# Patient Record
Sex: MALE | Race: WHITE | NOT HISPANIC OR LATINO | Employment: FULL TIME | URBAN - METROPOLITAN AREA
[De-identification: names, ages, dates, MRNs, and addresses within clinical notes are randomized per-mention and may not be internally consistent; named-entity substitution may affect disease eponyms.]

---

## 2023-06-28 ENCOUNTER — HOSPITAL ENCOUNTER (EMERGENCY)
Facility: HOSPITAL | Age: 63
Discharge: HOME | End: 2023-06-28
Attending: EMERGENCY MEDICINE | Admitting: EMERGENCY MEDICINE
Payer: COMMERCIAL

## 2023-06-28 VITALS
TEMPERATURE: 97.5 F | HEART RATE: 79 BPM | OXYGEN SATURATION: 95 % | DIASTOLIC BLOOD PRESSURE: 99 MMHG | RESPIRATION RATE: 20 BRPM | SYSTOLIC BLOOD PRESSURE: 151 MMHG | WEIGHT: 223.9 LBS

## 2023-06-28 DIAGNOSIS — R60.9 PERIPHERAL EDEMA: ICD-10-CM

## 2023-06-28 DIAGNOSIS — T63.461A WASP STING: Primary | ICD-10-CM

## 2023-06-28 PROCEDURE — 86618 LYME DISEASE ANTIBODY: CPT | Performed by: EMERGENCY MEDICINE

## 2023-06-28 PROCEDURE — 36415 COLL VENOUS BLD VENIPUNCTURE: CPT | Performed by: EMERGENCY MEDICINE

## 2023-06-28 RX ORDER — DEXAMETHASONE 4 MG/1
12 TABLET ORAL ONCE
Qty: 3 TABLET | Refills: 0 | Status: SHIPPED | OUTPATIENT
Start: 2023-07-01 | End: 2023-07-01

## 2023-06-28 RX ADMIN — DEXAMETHASONE SODIUM PHOSPHATE 10 MG: 10 INJECTION, SOLUTION INTRAMUSCULAR; INTRAVENOUS at 15:35

## 2023-06-28 NOTE — ED PROVIDER NOTES
History  Chief Complaint   Patient presents with   • Leg Swelling     Patient comes in with L leg swelling after he thinks he was stung by a wasp 4 weeks ago  Took 325mg of aspirin this morning because he has slight SOB, it is gone now     65year old male, here for left leg swelling  Stung by wasp a few days ago  Swelling persistent  No hx of or risk factors for DVT  None       Past Medical History:   Diagnosis Date   • Diabetes mellitus ([facility] [address] )    • Hypertension        History reviewed  No pertinent surgical history  History reviewed  No pertinent family history  I have reviewed and agree with the history as documented  E-Cigarette/Vaping     E-Cigarette/Vaping Substances     Social History     Tobacco Use   • Smoking status: Never   • Smokeless tobacco: Never   Substance Use Topics   • Alcohol use: Never   • Drug use: Never       Review of Systems   Constitutional: Negative  Negative for chills and fever  HENT: Negative  Negative for rhinorrhea, sore throat, trouble swallowing and voice change  Eyes: Negative  Negative for pain and visual disturbance  Respiratory: Negative  Negative for cough, shortness of breath and wheezing  Cardiovascular: Negative  Negative for chest pain and palpitations  Gastrointestinal: Negative for abdominal pain, diarrhea, nausea and vomiting  Genitourinary: Negative  Negative for dysuria and frequency  Musculoskeletal: Negative  Negative for gait problem, neck pain and neck stiffness  Skin: Negative  Negative for rash  Neurological: Negative  Negative for dizziness, speech difficulty, weakness, light-headedness and numbness  Physical Exam  Physical Exam  Vitals and nursing note reviewed  Constitutional:       General: He is not in acute distress  Appearance: He is well-developed  HENT:      Head: Normocephalic and atraumatic     Eyes:      Conjunctiva/sclera: Conjunctivae normal       Pupils: Pupils are equal, round, and reactive to light  Neck:      Trachea: No tracheal deviation  Cardiovascular:      Rate and Rhythm: Normal rate and regular rhythm  Pulmonary:      Effort: Pulmonary effort is normal  No respiratory distress  Breath sounds: Normal breath sounds  No wheezing or rales  Abdominal:      General: Bowel sounds are normal  There is no distension  Palpations: Abdomen is soft  Tenderness: There is no abdominal tenderness  There is no guarding or rebound  Musculoskeletal:         General: No tenderness or deformity  Normal range of motion  Cervical back: Normal range of motion and neck supple  Skin:     General: Skin is warm and dry  Capillary Refill: Capillary refill takes less than 2 seconds  Findings: No rash  Neurological:      Mental Status: He is alert and oriented to person, place, and time     Psychiatric:         Behavior: Behavior normal          Vital Signs  ED Triage Vitals   Temperature Pulse Respirations Blood Pressure SpO2   06/28/23 1457 06/28/23 1454 06/28/23 1454 06/28/23 1454 06/28/23 1454   97 5 °F (36 4 °C) 79 20 151/99 95 %      Temp Source Heart Rate Source Patient Position - Orthostatic VS BP Location FiO2 (%)   06/28/23 1457 06/28/23 1454 06/28/23 1454 06/28/23 1454 --   Tympanic Monitor Sitting Left arm       Pain Score       --                  Vitals:    06/28/23 1454   BP: 151/99   Pulse: 79   Patient Position - Orthostatic VS: Sitting         Visual Acuity      ED Medications  Medications   dexamethasone oral liquid 10 mg 1 mL (10 mg Oral Given 6/28/23 1535)       Diagnostic Studies  Results Reviewed     Procedure Component Value Units Date/Time    Lyme Antibody Profile with reflex to [facility] [[id number]] Collected: 06/28/23 1538    Lab Status: No result Specimen: Blood from Arm, Right                  No orders to display              Procedures  Procedures         ED Course                               SBIRT 20yo+    Flowsheet Row Most Recent Value   Initial "Alcohol Screen: US AUDIT-C     1  How often do you have a drink containing alcohol? 0 Filed at: 06/28/2023 1530   2  How many drinks containing alcohol do you have on a typical day you are drinking? 0 Filed at: 06/28/2023 1530   3a  Male UNDER 65: How often do you have five or more drinks on one occasion? 0 Filed at: 06/28/2023 1530   3b  FEMALE Any Age, or MALE 65+: How often do you have 4 or more drinks on one occassion? 0 Filed at: 06/28/2023 1530   Audit-C Score 0 Filed at: 06/28/2023 1530   KEISHA: How many times in the past year have you    Used an illegal drug or used a prescription medication for non-medical reasons? Never Filed at: 06/28/2023 1530                    Medical Decision Making  No medical records available to review  Swelling of LLE likely from allergic reaction to wasp sting  Started on steriods  No risk factors for DVT no inidcation for testing in ED  Patient described a red ring for \"rash\" that resolved  Will initiate lyme testing  Patient with [address] will follow up on results and establish with PCP or return to ED for antibiotics if test is positive  Amount and/or Complexity of Data Reviewed  Labs: ordered  Risk  Prescription drug management  Disposition  Final diagnoses:   Wasp sting   Peripheral edema     Time reflects when diagnosis was documented in both MDM as applicable and the Disposition within this note     Time User Action Codes Description Comment    6/28/2023  3:29 PM Zeb Cobos Add [U35 498A] Wasp sting     6/28/2023  3:29 PM Zeb Cobos Add [R60 9] Peripheral edema       ED Disposition     ED Disposition   Discharge    Condition   Stable    Date/Time   Wed Jun 28, 2023 [facility] discharge to home/self care                 Follow-up Information     Follow up With Specialties Details Why [address] In 1 week  [address], [address] [address] [facility], [address], [address] [place], [place], [address]          Patient's Medications   Discharge Prescriptions    DEXAMETHASONE (DECADRON) 4 MG TABLET    Take 3 tablets (12 mg total) by mouth 1 (one) time for 1 dose Do not start before July 1, 2023  Start Date: 7/1/2023  End Date: 7/1/2023       Order Dose: 12 mg       Quantity: 3 tablet    Refills: 0       No discharge procedures on file      PDMP Review     None          ED Provider  Electronically Signed by           Collette Hale DO  06/28/23 1542

## 2023-06-28 NOTE — Clinical Note
Adi Álvarezevre was seen and treated in our emergency department on 6/28/2023  Diagnosis:     Ranjanliliana Jeffrey    He may return on this date: 06/30/2023         If you have any questions or concerns, please don't hesitate to call        Roxanne Graves, DO    ______________________________           _______________          _______________  Hospital Representative                              Date                                Time

## 2023-06-29 LAB — B BURGDOR IGG+IGM SER-ACNC: <0.2 AI

## 2023-07-14 ENCOUNTER — HOSPITAL ENCOUNTER (EMERGENCY)
Facility: HOSPITAL | Age: 63
Discharge: HOME | End: 2023-07-14
Attending: EMERGENCY MEDICINE
Payer: COMMERCIAL

## 2023-07-14 ENCOUNTER — APPOINTMENT (EMERGENCY)
Dept: NON INVASIVE DIAGNOSTICS | Facility: HOSPITAL | Age: 63
End: 2023-07-14
Payer: COMMERCIAL

## 2023-07-14 ENCOUNTER — OFFICE VISIT (OUTPATIENT)
Dept: URGENT CARE | Facility: CLINIC | Age: 63
End: 2023-07-14
Payer: COMMERCIAL

## 2023-07-14 VITALS
RESPIRATION RATE: 18 BRPM | OXYGEN SATURATION: 97 % | TEMPERATURE: 96.5 F | SYSTOLIC BLOOD PRESSURE: 140 MMHG | HEART RATE: 68 BPM | DIASTOLIC BLOOD PRESSURE: 86 MMHG

## 2023-07-14 VITALS
WEIGHT: 227.96 LBS | OXYGEN SATURATION: 96 % | SYSTOLIC BLOOD PRESSURE: 154 MMHG | HEART RATE: 67 BPM | RESPIRATION RATE: 18 BRPM | TEMPERATURE: 98 F | DIASTOLIC BLOOD PRESSURE: 78 MMHG

## 2023-07-14 DIAGNOSIS — M79.89 LEFT LEG SWELLING: Primary | ICD-10-CM

## 2023-07-14 DIAGNOSIS — M79.89 PAIN AND SWELLING OF LEFT LOWER EXTREMITY: Primary | ICD-10-CM

## 2023-07-14 DIAGNOSIS — M79.605 PAIN AND SWELLING OF LEFT LOWER EXTREMITY: Primary | ICD-10-CM

## 2023-07-14 DIAGNOSIS — R60.0 LOWER EXTREMITY EDEMA: ICD-10-CM

## 2023-07-14 PROCEDURE — 99284 EMERGENCY DEPT VISIT MOD MDM: CPT

## 2023-07-14 PROCEDURE — 99213 OFFICE O/P EST LOW 20 MIN: CPT | Performed by: NURSE PRACTITIONER

## 2023-07-14 PROCEDURE — 93971 EXTREMITY STUDY: CPT

## 2023-07-14 NOTE — PROGRESS NOTES
[facility]        NAME: Betsy Munson is a 65 y.o. male  : 1960    MRN: [id number]  DATE: 2023  TIME: 11:09 AM    Assessment and Plan   Pain and swelling of left lower extremity [M79.605, M79.89]  1. Pain and swelling of left lower extremity  Transfer to other facility        LLE swelling x 2 weeks. Associated with warmth. Neg homans. PMH arrhythmia was on metoprolol but has stopped approx 2-3 years prior. Denies sob/chest pain/palpitations. Denies PMH dvt/pulm embolism. No recent surgery or increase in sedentary. No anticoagulant use- does take baby aspirin. Sent to ED for further eval. Transfer of facility completed. Patient Instructions       Sent to ED for further eval    Chief Complaint     Chief Complaint   Patient presents with   • Leg Swelling     Pt reports being seen back on  in the ED for insect bite and calf swelling. Pt prescribed medication with left calf swelling decreased. Pt C/O increased left calf swelling that started on Wednesday. History of Present Illness       Patient is a 81-year-old male arrives with complaints of left lower extremity swelling and pain/tightness of the extremity. Patient reports occurring greater than 2 weeks. Originally patient was seen 2023 at the [facility] ED diagnosed with wasp sting-patient reports some improvement since then however continues with the left lower extremity tightness/pain and swelling. At the time of the ED visit he was having slight shortness of breath he does report at this point he is not having any shortness of breath. Patient denies any past medical history of DVT pulmonary embolism. He denies a recent increase in sedentary activity, recent surgery. Patient denies any anticoagulant use. Does take baby aspirin regularly. He does have a past medical history of arrhythmia he was on metoprolol he reports he was taken off approximately 3 to 4 years ago.   Denies chest pain palpitations at this point. No signs of insect bite/sting any longer. No redness. Slight warmth to the extremity        Review of Systems   Review of Systems   Constitutional: Negative for activity change, appetite change, chills, fatigue and fever. HENT: Negative for congestion, rhinorrhea, sinus pressure, sinus pain and sore throat. Respiratory: Negative for cough, chest tightness, shortness of breath and wheezing. Cardiovascular: Positive for leg swelling (left leg with tightness/pain). Negative for chest pain and palpitations. Gastrointestinal: Negative for constipation, diarrhea, nausea and vomiting. Musculoskeletal: Negative for myalgias. Skin: Negative for color change, pallor and rash. Neurological: Negative for dizziness, syncope, weakness, light-headedness and headaches. Hematological: Negative for adenopathy. Psychiatric/Behavioral: Negative for agitation and confusion. Current Medications     No current outpatient medications on file. Current Allergies     Allergies as of 07/14/2023   • (No Known Allergies)            The following portions of the patient's history were reviewed and updated as appropriate: allergies, current medications, past family history, past medical history, past social history, past surgical history and problem list.     Past Medical History:   Diagnosis Date   • Diabetes mellitus ([address])    • Hypertension        History reviewed. No pertinent surgical history. History reviewed. No pertinent family history. Medications have been verified. Objective   /86 (BP Location: Right arm, Patient Position: Sitting, Cuff Size: Large)   Pulse 68   Temp (!) 96.5 °F (35.8 °C) (Tympanic)   Resp 18   SpO2 97%   No LMP for male patient. Physical Exam     Physical Exam  Vitals and nursing note reviewed. Constitutional:       General: He is not in acute distress. Appearance: Normal appearance. He is not ill-appearing, toxic-appearing or diaphoretic. HENT:      Head: Normocephalic and atraumatic. Nose: No congestion or rhinorrhea. Mouth/Throat:      Mouth: Mucous membranes are moist.   Eyes:      General: No scleral icterus. Right eye: No discharge. Left eye: No discharge. Conjunctiva/sclera: Conjunctivae normal.   Cardiovascular:      Rate and Rhythm: Normal rate and regular rhythm. Pulmonary:      Effort: Pulmonary effort is normal. No respiratory distress. Breath sounds: Normal breath sounds. No stridor. No wheezing, rhonchi or rales. Musculoskeletal:         General: Swelling (left lower extremity with warmth) present. Normal range of motion. Cervical back: Normal range of motion. Skin:     General: Skin is dry. Neurological:      Mental Status: He is alert and oriented to person, place, and time. Psychiatric:         Mood and Affect: Mood normal.         Behavior: Behavior normal.         Thought Content:  Thought content normal.         Judgment: Judgment normal.

## 2023-07-15 PROCEDURE — 93971 EXTREMITY STUDY: CPT | Performed by: SURGERY

## 2023-07-15 NOTE — ED PROVIDER NOTES
History  Chief Complaint   Patient presents with   • Leg Swelling     Pt states that left calf feels tight and swollen. States that 2 weeks ago he was seen by urgent care and ED for what he suspected was an insect bite or some sort in the back of left calf. Had lyme test done which was negative. Morgan Swann is a 64 yo M presenting with swelling to L leg over the past several weeks. He notes chronic bilateral lower leg swelling which worsens throughout the day and improves at night and with elevation. Reports being on his feet most of the day and attributes prior swelling to this. He reports several weeks ago he was doing yard work and felt some "stinging" in calf. Reports shortly after this is when the increased L sided swelling was noted, although he does not recall a specific insect bite or rash. The swelling has waxed/waned since that time overall, but notes persistently asymmetric from L side. No recent trauma or surgery. No prolonged immobilization or recent travel. No history of DVT or PE. No family history of blood clots or coagulation disorder. He is otherwise asymptomatic. History provided by:  Patient   used: No        None       Past Medical History:   Diagnosis Date   • Diabetes mellitus ([address])    • Hypertension        History reviewed. No pertinent surgical history. History reviewed. No pertinent family history. I have reviewed and agree with the history as documented. E-Cigarette/Vaping     E-Cigarette/Vaping Substances     Social History     Tobacco Use   • Smoking status: Never   • Smokeless tobacco: Never   Substance Use Topics   • Alcohol use: Never   • Drug use: Never       Review of Systems   Constitutional: Negative for chills and fever. HENT: Negative for congestion, rhinorrhea and sore throat. Eyes: Negative for pain and visual disturbance. Respiratory: Negative for cough, shortness of breath and wheezing. Cardiovascular: Positive for leg swelling. Negative for chest pain and palpitations. Gastrointestinal: Negative for abdominal pain, nausea and vomiting. Genitourinary: Negative for dysuria, frequency and urgency. Musculoskeletal: Negative for back pain, neck pain and neck stiffness. Skin: Negative for rash and wound. Neurological: Negative for dizziness, weakness, light-headedness and numbness. Physical Exam  Physical Exam  Constitutional:       General: He is not in acute distress. Appearance: He is well-developed. He is not diaphoretic. HENT:      Head: Normocephalic and atraumatic. Right Ear: External ear normal.      Left Ear: External ear normal.   Eyes:      Conjunctiva/sclera: Conjunctivae normal.      Pupils: Pupils are equal, round, and reactive to light. Cardiovascular:      Rate and Rhythm: Normal rate and regular rhythm. Heart sounds: Normal heart sounds. No murmur heard. No friction rub. No gallop. Comments: 1+ RLE and 2+ LLE edema, asymmetric L>R edema noted extending up to knee. 2+ dorsalis pedis, posterior tibial pulses distally. Intact sensation, brisk cap refill. Skin of both LLE's is pink/warm/dry  Pulmonary:      Effort: Pulmonary effort is normal. No respiratory distress. Breath sounds: Normal breath sounds. No wheezing, rhonchi or rales. Abdominal:      General: There is no distension. Palpations: Abdomen is soft. Tenderness: There is no abdominal tenderness. Musculoskeletal:      Cervical back: Normal range of motion and neck supple. Lymphadenopathy:      Cervical: No cervical adenopathy. Skin:     General: Skin is warm and dry. Capillary Refill: Capillary refill takes less than 2 seconds. Findings: No erythema or rash. Neurological:      Mental Status: He is alert and oriented to person, place, and time. Motor: No abnormal muscle tone.       Coordination: Coordination normal.   Psychiatric:         Behavior: Behavior normal.         Thought Content: Thought content normal.         Judgment: Judgment normal.         Vital Signs  ED Triage Vitals [07/14/23 1243]   Temperature Pulse Respirations Blood Pressure SpO2   98 °F (36.7 °C) 67 18 154/78 96 %      Temp Source Heart Rate Source Patient Position - Orthostatic VS BP Location FiO2 (%)   Oral Monitor -- -- --      Pain Score       No Pain           Vitals:    07/14/23 1243   BP: 154/78   Pulse: 67         Visual Acuity      ED Medications  Medications - No data to display    Diagnostic Studies  Results Reviewed     None                 VAS lower limb venous duplex study, unilateral/limited    (Results Pending)              Procedures  Procedures         ED Course                               SBIRT 20yo+    Flowsheet Row Most Recent Value   Initial Alcohol Screen: US AUDIT-C     1. How often do you have a drink containing alcohol? 0 Filed at: 07/14/2023 1407   2. How many drinks containing alcohol do you have on a typical day you are drinking? 0 Filed at: 07/14/2023 1407   3a. Male UNDER 65: How often do you have five or more drinks on one occasion? 0 Filed at: 07/14/2023 1407   Audit-C Score 0 Filed at: 07/14/2023 1407   KEISHA: How many times in the past year have you. .. Used an illegal drug or used a prescription medication for non-medical reasons? Never Filed at: 07/14/2023 1407                    Medical Decision Making  Ongoing chronic bilateral leg swelling, worse with standing/long periods upright and improved with elevation/at night suggesting possible venous insufficiency. He additionally has had asymmetric LLE swelling over the past few weeks which is noted on exam. He has no additional risk factors for DVT. No rashes/lesions noted, intact pulses/sensation distally. LLE duplex obtained here which is negative for acute DVT per initial vascular technician report. Pt is otherwise asymptomatic, remainder of exam benign. Recommend starting use of compression stockings when upright.  Follow up with PCP recommended for re-evaluation of symptoms. Return to ED indications reviewed. Disposition  Final diagnoses:   Left leg swelling   Lower extremity edema     Time reflects when diagnosis was documented in both MDM as applicable and the Disposition within this note     Time User Action Codes Description Comment    7/14/2023  2:17 PM Samanta Donato [M79.89] Left leg swelling     7/15/2023  7:17 AM Samanta Donato [R60.0] Lower extremity edema       ED Disposition     ED Disposition   Discharge    Condition   Stable    Date/Time   Fri Jul 14, 2023  2:17 PM    Comment   Carmencita Mccrary discharge to home/self care. Follow-up Information     Follow up With Specialties Details Why Contact Info Additional [facility] Emergency Department Emergency Medicine  If symptoms worsen [address] [id number]  [address] [facility] Emergency Department, [address] [place] [address], [facility]          There are no discharge medications for this patient.       Outpatient Discharge Orders   Compression Stocking       PDMP Review     None          ED Provider  Electronically Signed by           Martha Brantley PA-C  07/15/23 1124

## 2023-08-03 ENCOUNTER — OFFICE VISIT (OUTPATIENT)
Dept: FAMILY MEDICINE CLINIC | Facility: CLINIC | Age: 63
End: 2023-08-03
Payer: COMMERCIAL

## 2023-08-03 VITALS
HEART RATE: 75 BPM | BODY MASS INDEX: 29.8 KG/M2 | SYSTOLIC BLOOD PRESSURE: 128 MMHG | TEMPERATURE: 98 F | HEIGHT: 74 IN | OXYGEN SATURATION: 96 % | DIASTOLIC BLOOD PRESSURE: 78 MMHG | WEIGHT: 232.2 LBS

## 2023-08-03 DIAGNOSIS — Z13.29 SCREENING FOR THYROID DISORDER: ICD-10-CM

## 2023-08-03 DIAGNOSIS — Z11.59 NEED FOR HEPATITIS C SCREENING TEST: ICD-10-CM

## 2023-08-03 DIAGNOSIS — Z13.220 SCREENING, LIPID: ICD-10-CM

## 2023-08-03 DIAGNOSIS — Z13.1 SCREENING FOR DIABETES MELLITUS: ICD-10-CM

## 2023-08-03 DIAGNOSIS — Z12.5 SCREENING PSA (PROSTATE SPECIFIC ANTIGEN): ICD-10-CM

## 2023-08-03 DIAGNOSIS — M79.89 LEFT LEG SWELLING: ICD-10-CM

## 2023-08-03 DIAGNOSIS — M25.462 PAIN AND SWELLING OF LEFT KNEE: Primary | ICD-10-CM

## 2023-08-03 DIAGNOSIS — Z13.0 SCREENING, ANEMIA, DEFICIENCY, IRON: ICD-10-CM

## 2023-08-03 DIAGNOSIS — M25.562 PAIN AND SWELLING OF LEFT KNEE: Primary | ICD-10-CM

## 2023-08-03 PROCEDURE — 99214 OFFICE O/P EST MOD 30 MIN: CPT | Performed by: NURSE PRACTITIONER

## 2023-08-03 RX ORDER — PREDNISONE 10 MG/1
TABLET ORAL
Qty: 42 TABLET | Refills: 0 | Status: SHIPPED | OUTPATIENT
Start: 2023-08-03

## 2023-08-03 RX ORDER — DOXYCYCLINE HYCLATE 100 MG/1
100 CAPSULE ORAL EVERY 12 HOURS SCHEDULED
Qty: 20 CAPSULE | Refills: 0 | Status: SHIPPED | OUTPATIENT
Start: 2023-08-03 | End: 2023-08-13

## 2023-08-03 RX ORDER — ASPIRIN 325 MG
325 TABLET ORAL DAILY
COMMUNITY

## 2023-08-03 NOTE — ASSESSMENT & PLAN NOTE
Infection vs lymphedema  Check labs today  Rx Doxy and steroid taper for symptoms  Compression/Elevation  F/U 2 weeks   Return sooner with concerns

## 2023-08-03 NOTE — ASSESSMENT & PLAN NOTE
Left knee effusion - mild warmth  Referral to Ortho - likely drain  Itchy red, generalized rash  Check labs today  Rx Doxy and steroid taper for symptoms  Compression/Elevation  F/U 2 weeks   Return sooner with concerns

## 2023-08-03 NOTE — ASSESSMENT & PLAN NOTE
Infection vs lymphadema  Left knee effusion - mild warmth  Referral to Ortho - likely drain  Itchy red, generalized rash  Check labs today  Rx Doxy and steroid taper for symptoms  Compression/Elevation  F/U 2 weeks   Return sooner with concerns

## 2023-08-03 NOTE — PROGRESS NOTES
[facility]    ASSESSMENT AND PLAN     1. Pain and swelling of left knee  Assessment & Plan:  Left knee effusion - mild warmth  Referral to Ortho - likely drain  Itchy red, generalized rash  Check labs today  Rx Doxy and steroid taper for symptoms  Compression/Elevation  F/U 2 weeks   Return sooner with concerns    Orders:  -     Ambulatory Referral to Orthopedic Surgery; Future  -     doxycycline hyclate (VIBRAMYCIN) 100 mg capsule; Take 1 capsule (100 mg total) by mouth every 12 (twelve) hours for 10 days  -     predniSONE 10 mg tablet; Taper: 6 tabs x2 days, 5 tabs x2 days, 4 tabs x2 days, 3 tabs x2 days, 2 tabs x2 days, 1 tab x2 day    2. Left leg swelling  Assessment & Plan:  Infection vs lymphedema  Check labs today  Rx Doxy and steroid taper for symptoms  Compression/Elevation  F/U 2 weeks   Return sooner with concerns      3. Screening, anemia, deficiency, iron  -     CBC and differential; Future    4. Screening for diabetes mellitus  -     Comprehensive metabolic panel; Future    5. Screening, lipid  -     Lipid panel; Future    6. Screening PSA (prostate specific antigen)  -     PSA, Total Screen; Future    7. Need for hepatitis C screening test  -     Hepatitis C antibody; Future    8. Screening for thyroid disorder  -     TSH, 3rd generation with Free T4 reflex; Future         SUBJECTIVE       Patient ID: Josiah Bailey is a 65 y.o. male. Chief Complaint   Patient presents with   • New Patient Visit   • Mass     Bump on left knee. Patient was bit by bug on fathers day weekend. Leg was swollen almost immediately. Had red [place] around the spot and painful. Had pain coming from the back of his left and moved around to front. • itching   • stiff neck   • Edema     Left leg swollen. HISTORY OF PRESENT ILLNESS    New pt. Establishing primary care. Several years since last preventative visit. He will have records transferred. Presents today with acute problem.  Reports was bit by (presummed) wasp 2 months ago on left calf. Has had pain and swelling in left leg since. Initially entire leg was swollen - from thigh to foot. Reports though it has been improving. Lower calf still swollen today. Has a likely knee effusion and recently developed a itchy, generalized rash - since improved. He has been seen in urgent care and ER for same. Record reviewed:    6/28/ ER note  Leg Swelling  Patient comes in with L leg swelling after he thinks he was stung by a wasp 4 weeks ago. Took 325mg of aspirin this morning because he has slight SOB, it is gone now  65year old male, here for left leg swelling. Stung by wasp a few days ago. Swelling persistent. No hx of or risk factors for DVT. Treated with Decadron taper    6/29 Lyme (-)    7/14 Urgent care note  LLE swelling x 2 weeks. Associated with warmth. Neg homans. PMH arrhythmia was on metoprolol but has stopped approx 2-3 years prior. Denies sob/chest pain/palpitations. Denies PMH dvt/pulm embolism. No recent surgery or increase in sedentary. No anticoagulant use- does take baby aspirin. Sent to ED for further eval. Transfer of facility completed. 7/14 ER note  Freddy Maria is a 66 yo M presenting with swelling to L leg over the past several weeks. He notes chronic bilateral lower leg swelling which worsens throughout the day and improves at night and with elevation. Reports being on his feet most of the day and attributes prior swelling to this. He reports several weeks ago he was doing yard work and felt some "stinging" in calf. Reports shortly after this is when the increased L sided swelling was noted, although he does not recall a specific insect bite or rash. The swelling has waxed/waned since that time overall, but notes persistently asymmetric from L side. No recent trauma or surgery. No prolonged immobilization or recent travel. No history of DVT or PE. No family history of blood clots or coagulation disorder. He is otherwise asymptomatic. Duplex (-). Advised compression hose    Pt reports he is otherwise healthy. Has history of HTN and DM. Resolved after significant wieght loss (80 lbs). No lower takes medication. Over due for preventative care as above          The following portions of the patient's history were reviewed and updated as appropriate: allergies, current medications, past family history, past medical history, past social history, past surgical history, and problem list.    REVIEW OF SYSTEMS  Review of Systems   Constitutional: Negative. Negative for activity change, appetite change, fatigue and fever. Respiratory: Negative. Cardiovascular: Positive for leg swelling. Negative for chest pain and palpitations. Gastrointestinal: Negative. Musculoskeletal: Positive for joint swelling. Neurological: Negative. Psychiatric/Behavioral: Negative. OBJECTIVE      VITAL SIGNS  /78 (BP Location: Left arm, Patient Position: Sitting, Cuff Size: Standard)   Pulse 75   Temp 98 °F (36.7 °C)   Ht 6' 2" (1.88 m)   Wt 105 kg (232 lb 3.2 oz)   SpO2 96%   BMI 29.81 kg/m²     CURRENT MEDICATIONS    Current Outpatient Medications:   •  aspirin 325 mg tablet, Take 325 mg by mouth daily, Disp: , Rfl:   •  doxycycline hyclate (VIBRAMYCIN) 100 mg capsule, Take 1 capsule (100 mg total) by mouth every 12 (twelve) hours for 10 days, Disp: 20 capsule, Rfl: 0  •  predniSONE 10 mg tablet, Taper: 6 tabs x2 days, 5 tabs x2 days, 4 tabs x2 days, 3 tabs x2 days, 2 tabs x2 days, 1 tab x2 day, Disp: 42 tablet, Rfl: 0      PHYSICAL EXAMINATION   Physical Exam  Vitals and nursing note reviewed. Constitutional:       General: He is not in acute distress. Appearance: Normal appearance. He is well-developed and well-groomed. He is not ill-appearing. HENT:      Head: Normocephalic. Cardiovascular:      Rate and Rhythm: Normal rate and regular rhythm. Pulses:           Dorsalis pedis pulses are 2+ on the left side.         Posterior tibial pulses are 2+ on the right side and 2+ on the left side. Heart sounds: Normal heart sounds. Pulmonary:      Effort: Pulmonary effort is normal. No respiratory distress. Breath sounds: Normal breath sounds and air entry. No wheezing or rhonchi. Musculoskeletal:      Left knee: Swelling, effusion and erythema present. No bony tenderness. Tenderness present. Right lower leg: No edema. Left lower le+ Edema present. Skin:     General: Skin is warm and dry. Neurological:      General: No focal deficit present. Mental Status: He is alert and oriented to person, place, and time.    Psychiatric:         Attention and Perception: Attention normal.         Mood and Affect: Mood normal.         Behavior: Behavior normal.         Judgment: Judgment normal.

## 2023-08-12 ENCOUNTER — APPOINTMENT (OUTPATIENT)
Dept: LAB | Facility: CLINIC | Age: 63
End: 2023-08-12
Payer: COMMERCIAL

## 2023-08-12 DIAGNOSIS — Z13.0 SCREENING, ANEMIA, DEFICIENCY, IRON: ICD-10-CM

## 2023-08-12 DIAGNOSIS — Z13.220 SCREENING, LIPID: ICD-10-CM

## 2023-08-12 DIAGNOSIS — Z13.29 SCREENING FOR THYROID DISORDER: ICD-10-CM

## 2023-08-12 DIAGNOSIS — Z13.1 SCREENING FOR DIABETES MELLITUS: ICD-10-CM

## 2023-08-12 DIAGNOSIS — Z12.5 SCREENING PSA (PROSTATE SPECIFIC ANTIGEN): ICD-10-CM

## 2023-08-12 DIAGNOSIS — Z11.59 NEED FOR HEPATITIS C SCREENING TEST: ICD-10-CM

## 2023-08-12 LAB
ALBUMIN SERPL BCP-MCNC: 4 G/DL (ref 3.5–5)
ALP SERPL-CCNC: 50 U/L (ref 46–116)
ALT SERPL W P-5'-P-CCNC: 26 U/L (ref 12–78)
ANION GAP SERPL CALCULATED.3IONS-SCNC: 5 MMOL/L
AST SERPL W P-5'-P-CCNC: 13 U/L (ref 5–45)
BASOPHILS # BLD AUTO: 0.02 THOUSANDS/ÂΜL (ref 0–0.1)
BASOPHILS NFR BLD AUTO: 0 % (ref 0–1)
BILIRUB SERPL-MCNC: 1.53 MG/DL (ref 0.2–1)
BUN SERPL-MCNC: 13 MG/DL (ref 5–25)
CALCIUM SERPL-MCNC: 9.1 MG/DL (ref 8.3–10.1)
CHLORIDE SERPL-SCNC: 104 MMOL/L (ref 96–108)
CHOLEST SERPL-MCNC: 169 MG/DL
CO2 SERPL-SCNC: 28 MMOL/L (ref 21–32)
CREAT SERPL-MCNC: 0.75 MG/DL (ref 0.6–1.3)
EOSINOPHIL # BLD AUTO: 0.03 THOUSAND/ÂΜL (ref 0–0.61)
EOSINOPHIL NFR BLD AUTO: 1 % (ref 0–6)
ERYTHROCYTE [DISTWIDTH] IN BLOOD BY AUTOMATED COUNT: 13.5 % (ref 11.6–15.1)
GFR SERPL CREATININE-BSD FRML MDRD: 99 ML/MIN/1.73SQ M
GLUCOSE P FAST SERPL-MCNC: 119 MG/DL (ref 65–99)
HCT VFR BLD AUTO: 42.4 % (ref 36.5–49.3)
HDLC SERPL-MCNC: 60 MG/DL
HGB BLD-MCNC: 14.1 G/DL (ref 12–17)
IMM GRANULOCYTES # BLD AUTO: 0.03 THOUSAND/UL (ref 0–0.2)
IMM GRANULOCYTES NFR BLD AUTO: 1 % (ref 0–2)
LDLC SERPL CALC-MCNC: 97 MG/DL (ref 0–100)
LYMPHOCYTES # BLD AUTO: 1.23 THOUSANDS/ÂΜL (ref 0.6–4.47)
LYMPHOCYTES NFR BLD AUTO: 21 % (ref 14–44)
MCH RBC QN AUTO: 29.8 PG (ref 26.8–34.3)
MCHC RBC AUTO-ENTMCNC: 33.3 G/DL (ref 31.4–37.4)
MCV RBC AUTO: 90 FL (ref 82–98)
MONOCYTES # BLD AUTO: 0.36 THOUSAND/ÂΜL (ref 0.17–1.22)
MONOCYTES NFR BLD AUTO: 6 % (ref 4–12)
NEUTROPHILS # BLD AUTO: 4.32 THOUSANDS/ÂΜL (ref 1.85–7.62)
NEUTS SEG NFR BLD AUTO: 71 % (ref 43–75)
NONHDLC SERPL-MCNC: 109 MG/DL
NRBC BLD AUTO-RTO: 0 /100 WBCS
PLATELET # BLD AUTO: 229 THOUSANDS/UL (ref 149–390)
PMV BLD AUTO: 11.1 FL (ref 8.9–12.7)
POTASSIUM SERPL-SCNC: 4.3 MMOL/L (ref 3.5–5.3)
PROT SERPL-MCNC: 7.5 G/DL (ref 6.4–8.4)
PSA SERPL-MCNC: 0.3 NG/ML (ref 0–4)
RBC # BLD AUTO: 4.73 MILLION/UL (ref 3.88–5.62)
SODIUM SERPL-SCNC: 137 MMOL/L (ref 135–147)
TRIGL SERPL-MCNC: 59 MG/DL
TSH SERPL DL<=0.05 MIU/L-ACNC: 0.97 UIU/ML (ref 0.45–4.5)
WBC # BLD AUTO: 5.99 THOUSAND/UL (ref 4.31–10.16)

## 2023-08-12 PROCEDURE — 84443 ASSAY THYROID STIM HORMONE: CPT

## 2023-08-12 PROCEDURE — G0103 PSA SCREENING: HCPCS

## 2023-08-12 PROCEDURE — 85025 COMPLETE CBC W/AUTO DIFF WBC: CPT

## 2023-08-12 PROCEDURE — 86803 HEPATITIS C AB TEST: CPT

## 2023-08-12 PROCEDURE — 36415 COLL VENOUS BLD VENIPUNCTURE: CPT

## 2023-08-12 PROCEDURE — 80061 LIPID PANEL: CPT

## 2023-08-12 PROCEDURE — 80053 COMPREHEN METABOLIC PANEL: CPT

## 2023-08-13 LAB — HCV AB SER QL: NORMAL

## 2023-08-24 ENCOUNTER — APPOINTMENT (OUTPATIENT)
Age: 63
End: 2023-08-24
Payer: COMMERCIAL

## 2023-08-24 ENCOUNTER — OFFICE VISIT (OUTPATIENT)
Age: 63
End: 2023-08-24
Payer: COMMERCIAL

## 2023-08-24 VITALS
BODY MASS INDEX: 29.13 KG/M2 | WEIGHT: 227 LBS | DIASTOLIC BLOOD PRESSURE: 74 MMHG | HEART RATE: 62 BPM | SYSTOLIC BLOOD PRESSURE: 125 MMHG | HEIGHT: 74 IN

## 2023-08-24 DIAGNOSIS — M25.462 PAIN AND SWELLING OF LEFT KNEE: ICD-10-CM

## 2023-08-24 DIAGNOSIS — M25.462 PAIN AND SWELLING OF LEFT KNEE: Primary | ICD-10-CM

## 2023-08-24 DIAGNOSIS — M25.562 PAIN AND SWELLING OF LEFT KNEE: ICD-10-CM

## 2023-08-24 DIAGNOSIS — M70.42 PREPATELLAR BURSITIS OF LEFT KNEE: Primary | ICD-10-CM

## 2023-08-24 DIAGNOSIS — M25.562 PAIN AND SWELLING OF LEFT KNEE: Primary | ICD-10-CM

## 2023-08-24 LAB
APPEARANCE FLD: ABNORMAL
BASOPHILS NFR SNV MANUAL: 1 %
COLOR FLD: ABNORMAL
CRYSTALS SNV QL MICRO: NORMAL
EOSINOPHIL NFR SNV MANUAL: 4 %
LYMPHOCYTES # SNV MANUAL: 23 %
MONOCYTES NFR SNV MANUAL: 11 %
NEUTROPHILS NFR SNV MANUAL: 58 %
RBC # SNV MANUAL: ABNORMAL /UL (ref 0–10)
SITE: ABNORMAL
SYNOVIOCYTES NFR SNV: 3 %
TOTAL CELLS COUNTED SPEC: 100
WBC # FLD MANUAL: 222 /UL (ref 0–200)

## 2023-08-24 PROCEDURE — 87205 SMEAR GRAM STAIN: CPT | Performed by: STUDENT IN AN ORGANIZED HEALTH CARE EDUCATION/TRAINING PROGRAM

## 2023-08-24 PROCEDURE — 87476 LYME DIS DNA AMP PROBE: CPT | Performed by: STUDENT IN AN ORGANIZED HEALTH CARE EDUCATION/TRAINING PROGRAM

## 2023-08-24 PROCEDURE — 20610 DRAIN/INJ JOINT/BURSA W/O US: CPT | Performed by: STUDENT IN AN ORGANIZED HEALTH CARE EDUCATION/TRAINING PROGRAM

## 2023-08-24 PROCEDURE — 99204 OFFICE O/P NEW MOD 45 MIN: CPT | Performed by: STUDENT IN AN ORGANIZED HEALTH CARE EDUCATION/TRAINING PROGRAM

## 2023-08-24 PROCEDURE — 89060 EXAM SYNOVIAL FLUID CRYSTALS: CPT | Performed by: STUDENT IN AN ORGANIZED HEALTH CARE EDUCATION/TRAINING PROGRAM

## 2023-08-24 PROCEDURE — 89050 BODY FLUID CELL COUNT: CPT | Performed by: STUDENT IN AN ORGANIZED HEALTH CARE EDUCATION/TRAINING PROGRAM

## 2023-08-24 PROCEDURE — 87070 CULTURE OTHR SPECIMN AEROBIC: CPT | Performed by: STUDENT IN AN ORGANIZED HEALTH CARE EDUCATION/TRAINING PROGRAM

## 2023-08-24 PROCEDURE — 89051 BODY FLUID CELL COUNT: CPT

## 2023-08-24 PROCEDURE — 73564 X-RAY EXAM KNEE 4 OR MORE: CPT

## 2023-08-24 RX ORDER — BUPIVACAINE HYDROCHLORIDE 2.5 MG/ML
2 INJECTION, SOLUTION INFILTRATION; PERINEURAL
Status: COMPLETED | OUTPATIENT
Start: 2023-08-24 | End: 2023-08-24

## 2023-08-24 RX ADMIN — BUPIVACAINE HYDROCHLORIDE 2 ML: 2.5 INJECTION, SOLUTION INFILTRATION; PERINEURAL at 08:00

## 2023-08-24 NOTE — PROGRESS NOTES
1. Prepatellar bursitis of left knee  Body fluid culture and Gram stain    Lyme disease, PCR    RBC count,Synovial Fluid    Synovial fluid white cell count w/ diff    Synovial fluid, crystal    Large joint arthrocentesis: L prepatellar bursa    bupivacaine (MARCAINE) 0.25 % injection 2 mL      2. Pain and swelling of left knee  Ambulatory Referral to Orthopedic Surgery    XR knee 4+ vw left injury    Body fluid culture and Gram stain    Lyme disease, PCR    RBC count,Synovial Fluid    Synovial fluid white cell count w/ diff    Synovial fluid, crystal    Large joint arthrocentesis: L prepatellar bursa    bupivacaine (MARCAINE) 0.25 % injection 2 mL        Orders Placed This Encounter   Procedures   • Large joint arthrocentesis: L prepatellar bursa   • Body fluid culture and Gram stain   • XR knee 4+ vw left injury   • Lyme disease, PCR   • RBC count,Synovial Fluid   • Synovial fluid white cell count w/ diff   • Synovial fluid, crystal        Imaging Studies (I personally reviewed images in PACS and report):    • X-ray left knee 8/24/2023: No acute osseous abnormalities. Mild patellofemoral osteoarthritic changes as evidenced by tiny superior and inferior patellar spurs. Anterior knee soft tissue swelling consistent with a prepatellar bursitis on clinical exam    IMPRESSION:  • Cute atraumatic left anterior knee pain pain/swelling  • Clinical examination consistent with prepatellar bursitis - bloody aspirate on procedure today (suspected traumatic bursitis, but will send for further studies given prior history of leg swelling/discoloration requiring antibiotics to rule out other causes)  • Of note, patient previously was seen for left leg swelling/discoloration in which a DVT was ruled out. He was recently put on doxycycline and a Medrol Dosepak which significantly improved his overall leg swelling excluding his anterior knee and discoloration.   • Patient has full range of motion of his knee despite prepatellar swelling    Other factors:  • Occupation as an  where he has to work on his knees frequently    PLAN:    • Clinical exam and radiographic imaging reviewed with patient today, with impression as per above. I have discussed with the patient the pathophysiology of this diagnosis and reviewed how the examination correlates with this diagnosis. • Radiographs of his left knee were obtained today as noted above. • I counseled that his clinical exam and history consistent with prepatellar bursitis. Treatment options were discussed and after reviewing risk/benefits, patient was agreeable to an ultrasound-guided aspiration of his prepatellar bursa as per procedure note below. • I counseled that despite aspiration of his bursa today, typically there is a 50% chance that his bursa can recur again. I counseled in order to minimize these risk he should ice his knee, avoid any activities that irritate his knee by kneeling frequently. I suggested wearing a knee pad while working to minimize irritation. Counseled in some circumstances if there is recurrence of his prepatellar bursitis, surgical intervention can be performed as well. Return if symptoms worsen or fail to improve, for I will reach out about fluid study lab results. Portions of the record may have been created with voice recognition software. Occasional wrong word or "sound a like" substitutions may have occurred due to the inherent limitations of voice recognition software. Read the chart carefully and recognize, using context, where substitutions have occurred. CHIEF COMPLAINT:  Chief Complaint   Patient presents with   • Left Knee - Pain         HPI:  Julian Armijo is a 65 y.o. male  who presents for       Visit 8/24/2023:  Initial evaluation of left anterior knee pain swelling/swelling:   Of note we discussed with patient on chart review patient had been complaining of a left calf/thigh swelling and discoloration after reported undergoing some bug bites. He is been under 2 rounds of oral steroid packs and most recently was on doxycycline for 10 days from his PCP. He states he had significant improvement after completing the antibiotics and that overall his left leg swelling and discoloration has resolved. He states the only lingering issue is an anterior knee swelling that is aggravated when kneeling. Otherwise, has full knee ROM, strength. Denies instability while walking. He does work as an . He denies any discoloration over his anterior knee. He denies any F/C, N/V. Medical, Surgical, Family, and Social History    Past Medical History:   Diagnosis Date   • Diabetes mellitus ([address])    • Hypertension      Past Surgical History:   Procedure Laterality Date   • CYST REMOVAL Bilateral    • HERNIA REPAIR Bilateral      Social History   Social History     Substance and Sexual Activity   Alcohol Use Never     Social History     Substance and Sexual Activity   Drug Use Never     Social History     Tobacco Use   Smoking Status Never   • Passive exposure: Never   Smokeless Tobacco Never     Family History   Problem Relation Age of Onset   • Breast cancer Mother    • Aortic aneurysm Father    • Arrhythmia Sister      No Known Allergies       Physical Exam  /74   Pulse 62   Ht 6' 2" (1.88 m)   Wt 103 kg (227 lb)   BMI 29.15 kg/m²     Constitutional:  see vital signs  Gen: well-developed, normocephalic/atraumatic, well-groomed  Eyes: No inflammation or discharge of conjunctiva or lids; sclera clear   Pulmonary/Chest: Effort normal. No respiratory distress.        Ortho Exam  Left Knee Exam:  Erythema: no  Swellin+prepatellar bursa swelling   Increased Warmth: no  Tenderness: none  ROM: 0-130  Knee flexion strength: 5/5  Knee extension strength: 5/5  Patellar Apprehension: negative  Patellar Grind: +  Lachman's: negative  Anterior Drawer: negative  Varus laxity: negative  Valgus laxity: negative  Alexia: negative    No calf tenderness to palpation     Gait: normal without antalgia      Large joint arthrocentesis: L prepatellar bursa  Universal Protocol:  Consent: Verbal consent obtained. Risks and benefits: risks, benefits and alternatives were discussed  Consent given by: patient  Patient understanding: patient states understanding of the procedure being performed  Site marked: the operative site was marked  Radiology Images displayed and confirmed.  If images not available, report reviewed: imaging studies available  Required items: required blood products, implants, devices, and special equipment available  Patient identity confirmed: verbally with patient    Supporting Documentation  Indications: pain   Procedure Details  Location: knee - L prepatellar bursa  Preparation: Patient was prepped and draped in the usual sterile fashion  Needle size: 18 G  Ultrasound guidance: no (Ultrasound assisted)  Approach: anterolateral  Medications administered: 2 mL bupivacaine 0.25 %    Aspirate amount: 13 mL  Aspirate: bloody  Analysis: fluid sample sent for laboratory analysis    Patient tolerance: patient tolerated the procedure well with no immediate complications  Dressing:  Sterile dressing applied    Prior to aspiration, 2cc of bupivacaine 2% anesthetic was injected subcutaneously

## 2023-08-27 LAB
BACTERIA SPEC BFLD CULT: NO GROWTH
GRAM STN SPEC: NORMAL

## 2023-08-28 LAB
B BURGDOR DNA SPEC QL NAA+PROBE: NEGATIVE
BACTERIA SPEC BFLD CULT: NO GROWTH
GRAM STN SPEC: NORMAL

## 2023-08-29 ENCOUNTER — TELEPHONE (OUTPATIENT)
Age: 63
End: 2023-08-29

## 2023-08-29 NOTE — TELEPHONE ENCOUNTER
Caller: Patient     Doctor: Mario Tracy     Reason for call: Patient missed a call and believes it was from [facility]     Call back#: [phone]

## 2023-08-30 NOTE — TELEPHONE ENCOUNTER
Spoke with Pt, he is aware of Dr Gustabo Jasmine message in results review of culture. Culture was negative , Pt said knee is doing better, swelling has gone down. Pt is aware to come back into office if knee swells again. family member

## 2024-08-28 ENCOUNTER — OFFICE VISIT (OUTPATIENT)
Dept: PULMONOLOGY | Facility: CLINIC | Age: 64
End: 2024-08-28
Payer: COMMERCIAL

## 2024-08-28 VITALS
SYSTOLIC BLOOD PRESSURE: 138 MMHG | OXYGEN SATURATION: 96 % | BODY MASS INDEX: 32.98 KG/M2 | HEIGHT: 74 IN | DIASTOLIC BLOOD PRESSURE: 74 MMHG | WEIGHT: 257 LBS | TEMPERATURE: 98.3 F | HEART RATE: 83 BPM

## 2024-08-28 DIAGNOSIS — Z87.891 FORMER SMOKER: ICD-10-CM

## 2024-08-28 DIAGNOSIS — R09.89 CHRONIC THROAT CLEARING: Primary | ICD-10-CM

## 2024-08-28 PROCEDURE — 99203 OFFICE O/P NEW LOW 30 MIN: CPT | Performed by: INTERNAL MEDICINE

## 2024-08-28 RX ORDER — ASPIRIN 81 MG/1
81 TABLET, CHEWABLE ORAL DAILY
COMMUNITY

## 2024-08-28 RX ORDER — FLUTICASONE PROPIONATE 50 MCG
1 SPRAY, SUSPENSION (ML) NASAL 2 TIMES DAILY
Qty: 16 G | Refills: 5 | Status: SHIPPED | OUTPATIENT
Start: 2024-08-28

## 2024-08-28 NOTE — ASSESSMENT & PLAN NOTE
Unclear etiology but suspect postnasal drip so I decided to give him Flonase twice daily and told him to try for at least 4-6 weeks before stopping if he does not feel any improvement.  If there is partial improvement then probably we can add ipratropium nasal spray.  Otherwise if after all these empiric treatments he continues to have same complaint then probably he will need ENT evaluation.

## 2024-08-28 NOTE — PROGRESS NOTES
Consultation - Pulmonary Medicine   Mitchell Whatley 63 y.o. male MRN: [id number]    Physician Requesting Consult: JULISSA Miranda    Reason for Consult: Chronic throat clearing    Chronic throat clearing  Unclear etiology but suspect postnasal drip so I decided to give him Flonase twice daily and told him to try for at least 4-6 weeks before stopping if he does not feel any improvement.  If there is partial improvement then probably we can add ipratropium nasal spray.  Otherwise if after all these empiric treatments he continues to have same complaint then probably he will need ENT evaluation.    Former smoker  I will check chest x-ray especially with his above complaint although he denies cough.      No follow-ups on file.    Diagnoses and all orders for this visit:    Chronic throat clearing  -     fluticasone (FLONASE) 50 mcg/act nasal spray; 1 spray into each nostril 2 (two) times a day  -     XR chest pa & lateral; Future    Former smoker  -     XR chest pa & lateral; Future    Other orders  -     aspirin 81 mg chewable tablet; Chew 81 mg daily      ______________________________________________________________________    HPI:    Mitchell Whatley is a 63 y.o. male who presents for evaluation of chronic throat clearing sensation that has been going for 3 years, he denies cough but he has this chronic accumulation of sputum in his throat that describes as clear and frothy.  Denies shortness of breath or dyspnea on exertion, denies wheezing or chest tightness or pain, denies fever chills or night sweats, denies weight loss.  Sometimes he has nasal congestion but denies significant postnasal drip.  He denies facial pain or fullness.  He had some workup and therapies in the past, initially treated as acid reflux then he had workup to rule out H. pylori that was negative.  He was not treated with any inhalers or nasal sprays.  Otherwise patient feels fine, denies history of pulmonary or cardiac disease although he  was told to take baby aspirin in the past as prophylactic measure.  He used to have some acid reflux in the past when he ate food late at night but currently feels fine.      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  He smoked for about 15-20 years, 1-2 packs/day, quit 25 years ago.        Review of Systems:  Review of Systems   Constitutional: Negative.  Negative for appetite change and fever.   HENT:  Negative for ear pain, postnasal drip, rhinorrhea, sneezing, sore throat and trouble swallowing.         Clearing throat as HPI   Eyes: Negative.    Respiratory: Negative.     Cardiovascular: Negative.  Negative for chest pain.   Gastrointestinal: Negative.    Endocrine: Negative.    Genitourinary: Negative.    Musculoskeletal: Negative.  Negative for myalgias.   Skin: Negative.    Allergic/Immunologic: Negative.    Neurological: Negative.  Negative for headaches.   Hematological: Negative.    Psychiatric/Behavioral: Negative.       Aside from what is mentioned in the HPI, the review of systems otherwise negative.    Current Medications:    Current Outpatient Medications:     aspirin 81 mg chewable tablet, Chew 81 mg daily, Disp: , Rfl:     aspirin 325 mg tablet, Take 325 mg by mouth daily, Disp: , Rfl:     predniSONE 10 mg tablet, Taper: 6 tabs x2 days, 5 tabs x2 days, 4 tabs x2 days, 3 tabs x2 days, 2 tabs x2 days, 1 tab x2 day, Disp: 42 tablet, Rfl: 0    Historical Information   Past Medical History:   Diagnosis Date    Diabetes mellitus (HCC)     Hypertension      Past Surgical History:   Procedure Laterality Date    CYST REMOVAL Bilateral     HERNIA REPAIR Bilateral      Social History   Social History     Tobacco Use   Smoking Status Former    Current packs/day: 0.00    Average packs/day: 2.0 packs/day for 24.0 years (48.0 ttl pk-yrs)    Types: Cigarettes    Start date:     Quit date:     Years since quittin.6     "Passive exposure: Never   Smokeless Tobacco Never       Occupational history:  No significant occupational exposure    Family History:   Family History   Problem Relation Age of Onset    Breast cancer Mother     Aortic aneurysm Father     Arrhythmia Sister          PhysicalExamination:  Vitals:   /74 (BP Location: Left arm, Patient Position: Sitting, Cuff Size: Large)   Pulse 83   Temp 98.3 °F (36.8 °C) (Tympanic)   Ht 6' 2\" (1.88 m)   Wt 117 kg (257 lb)   SpO2 96%   BMI 33.00 kg/m²     Gen:  Comfortable on room air.  No conversational dyspnea  HEENT:  Conjugate gaze.  sclerae anicteric.  Oropharynx moist  Neck: Trachea is midline. No JVD. No adenopathy  Chest: Equal breath sounds and clear to auscultation bilaterally, no wheezing or crackles  Cardiac: S1-S2 regular. no murmur  Abdomen:  benign  Extremities: + Mild pitting edema  Neuro:  Normal speech and mentation      Diagnostic Data:  Labs:  I personally reviewed the most recent laboratory data pertinent to today's visit    Lab Results   Component Value Date    WBC 5.99 08/12/2023    HGB 14.1 08/12/2023    HCT 42.4 08/12/2023    MCV 90 08/12/2023     08/12/2023     Lab Results   Component Value Date    CALCIUM 9.1 08/12/2023    K 4.3 08/12/2023    CO2 28 08/12/2023     08/12/2023    BUN 13 08/12/2023    CREATININE 0.75 08/12/2023     No results found for: \"IGE\"  Lab Results   Component Value Date    ALT 26 08/12/2023    AST 13 08/12/2023    ALKPHOS 50 08/12/2023           Malebernarda Sharma MD  Answers submitted by the patient for this visit:  Pulmonology Questionnaire (Submitted on 8/26/2024)  Chief Complaint: Primary symptoms  Do you experience frequent throat clearing?: Yes  How often do your symptoms occur?: constantly  Since you first noticed this problem, how has it changed?: unchanged  Do you have shortness of breath that occurs with effort or exertion?: No  Do you have ear congestion?: No  Do you have heartburn?: No  Do you " have fatigue?: No  Do you have nasal congestion?: No  Do you have shortness of breath when lying flat?: No  Do you have shortness of breath when you wake up?: No  Do you have sweats?: No  Have you experienced weight loss?: No  Which of the following makes your symptoms worse?: eating

## 2024-09-27 PROBLEM — R09.89 CHRONIC THROAT CLEARING: Status: RESOLVED | Noted: 2024-08-28 | Resolved: 2024-09-27

## 2025-06-02 ENCOUNTER — TELEPHONE (OUTPATIENT)
Dept: FAMILY MEDICINE CLINIC | Facility: CLINIC | Age: 65
End: 2025-06-02